# Patient Record
Sex: MALE | Race: WHITE | NOT HISPANIC OR LATINO | Employment: FULL TIME | URBAN - METROPOLITAN AREA
[De-identification: names, ages, dates, MRNs, and addresses within clinical notes are randomized per-mention and may not be internally consistent; named-entity substitution may affect disease eponyms.]

---

## 2023-08-14 ENCOUNTER — HOSPITAL ENCOUNTER (EMERGENCY)
Facility: CLINIC | Age: 73
Discharge: HOME | End: 2023-08-14
Attending: EMERGENCY MEDICINE | Admitting: EMERGENCY MEDICINE
Payer: COMMERCIAL

## 2023-08-14 VITALS
HEART RATE: 57 BPM | OXYGEN SATURATION: 96 % | SYSTOLIC BLOOD PRESSURE: 146 MMHG | RESPIRATION RATE: 16 BRPM | DIASTOLIC BLOOD PRESSURE: 75 MMHG | TEMPERATURE: 97.6 F

## 2023-08-14 DIAGNOSIS — H60.391 INFECTIVE OTITIS EXTERNA, RIGHT: ICD-10-CM

## 2023-08-14 PROCEDURE — G0463 HOSPITAL OUTPT CLINIC VISIT: HCPCS | Performed by: EMERGENCY MEDICINE

## 2023-08-14 PROCEDURE — 99204 OFFICE O/P NEW MOD 45 MIN: CPT | Performed by: EMERGENCY MEDICINE

## 2023-08-14 RX ORDER — TRIAMTERENE/HYDROCHLOROTHIAZID 37.5-25 MG
1 TABLET ORAL DAILY
COMMUNITY
Start: 2023-03-29

## 2023-08-14 RX ORDER — CIPROFLOXACIN AND DEXAMETHASONE 3; 1 MG/ML; MG/ML
4 SUSPENSION/ DROPS AURICULAR (OTIC) 2 TIMES DAILY
Qty: 7.5 ML | Refills: 0 | Status: SHIPPED | OUTPATIENT
Start: 2023-08-14

## 2023-08-14 RX ORDER — EZETIMIBE 10 MG/1
1 TABLET ORAL DAILY
COMMUNITY
Start: 2023-07-06

## 2023-08-14 NOTE — ED TRIAGE NOTES
Pt reports bilateral ear pain but mostly in right.  Pt was seen on 8/5 and finished the cefdinir on 8/12 and still experience symptoms

## 2023-08-14 NOTE — ED PROVIDER NOTES
History     Chief Complaint   Patient presents with    Otalgia     HPI  Eulalio Benjamin is a 72 year old male who presents to the emergency department with concerns regarding right-sided ear pain.  Patient has recent visit for which he was prescribed cefdinir.  Reports ongoing pain of the right ear.  Left ear is improved.  No fever.  Has muffled hearing from the right ear.    Allergies:  No Known Allergies    Problem List:    There are no problems to display for this patient.       Past Medical History:    No past medical history on file.    Past Surgical History:    No past surgical history on file.    Family History:    No family history on file.    Social History:  Marital Status:   [2]        Medications:    amoxicillin-clavulanate (AUGMENTIN) 875-125 MG tablet  ciprofloxacin-dexAMETHasone (CIPRODEX) 0.3-0.1 % otic suspension  ezetimibe (ZETIA) 10 MG tablet  triamterene-HCTZ (MAXZIDE-25) 37.5-25 MG tablet          Review of Systems  See HPI  Physical Exam   BP: (!) 146/75  Pulse: 57  Temp: 97.6  F (36.4  C)  Resp: 16  SpO2: 96 %      Physical Exam  BP (!) 146/75   Pulse 57   Temp 97.6  F (36.4  C) (Oral)   Resp 16   SpO2 96%   General: alert and in no acute distress  Head: atraumatic, normocephalic  Right ear with diffuse swelling of the external auditory canal, with yellowish swelling of the auditory canal.  Abd: nondistended  Musculoskel/Extremities: normal extremities, no apparent edema, and full AROM of major joints  Skin: no rashes, no diaphoresis and skin color normal  Neuro: Patient awake, alert, oriented, speech is fluent, gait is normal  Psychiatric: affect/mood normal, cooperative, normal judgement/insight and memory intact    ED Course                 Procedures              Critical Care time:  none               No results found for this or any previous visit (from the past 24 hour(s)).    Medications - No data to display    Assessments & Plan (with Medical Decision Making)  72  year old male resenting to the urgent care with ongoing pain, and swelling of the right external auditory canal.  Left ear is normal.  Clinically, patient with signs of external otitis, and will be prescribed steroid eardrop, in addition to Augmentin.  Follow-up in clinic for recheck in 1 week was encouraged to the patient.  Return instructions discussed if new or worsening symptoms develop.  No tenderness over the mastoid process.  He is otherwise nontoxic-appearing     I have reviewed the nursing notes.    I have reviewed the findings, diagnosis, plan and need for follow up with the patient.                 New Prescriptions    AMOXICILLIN-CLAVULANATE (AUGMENTIN) 875-125 MG TABLET    Take 1 tablet by mouth 2 times daily for 10 days    CIPROFLOXACIN-DEXAMETHASONE (CIPRODEX) 0.3-0.1 % OTIC SUSPENSION    Place 4 drops into the right ear 2 times daily       Final diagnoses:   Infective otitis externa, right       8/14/2023   [facility] EMERGENCY DEPT       Gerardo Goins MD  08/14/23 [id number]